# Patient Record
Sex: FEMALE | Race: BLACK OR AFRICAN AMERICAN | Employment: OTHER | URBAN - METROPOLITAN AREA
[De-identification: names, ages, dates, MRNs, and addresses within clinical notes are randomized per-mention and may not be internally consistent; named-entity substitution may affect disease eponyms.]

---

## 2020-10-02 ENCOUNTER — HOSPITAL ENCOUNTER (OUTPATIENT)
Dept: WOUND CARE | Age: 85
Discharge: HOME | End: 2020-10-02
Payer: MEDICARE

## 2020-10-02 VITALS
HEART RATE: 61 BPM | SYSTOLIC BLOOD PRESSURE: 152 MMHG | HEIGHT: 55 IN | WEIGHT: 212 LBS | TEMPERATURE: 98 F | DIASTOLIC BLOOD PRESSURE: 67 MMHG | BODY MASS INDEX: 49.06 KG/M2 | RESPIRATION RATE: 18 BRPM

## 2020-10-02 PROBLEM — Z95.0 PACEMAKER: Status: ACTIVE | Noted: 2020-10-02

## 2020-10-02 PROBLEM — R60.0 LOWER EXTREMITY EDEMA: Status: ACTIVE | Noted: 2020-10-02

## 2020-10-02 PROBLEM — I50.30 HEART FAILURE, DIASTOLIC (HCC): Status: ACTIVE | Noted: 2020-10-02

## 2020-10-02 PROBLEM — L97.929 IDIOPATHIC CHRONIC VENOUS HYPERTENSION OF LEFT LOWER EXTREMITY WITH ULCER (HCC): Status: ACTIVE | Noted: 2020-10-02

## 2020-10-02 PROBLEM — I87.312 IDIOPATHIC CHRONIC VENOUS HYPERTENSION OF LEFT LOWER EXTREMITY WITH ULCER (HCC): Status: ACTIVE | Noted: 2020-10-02

## 2020-10-02 PROCEDURE — 99214 OFFICE O/P EST MOD 30 MIN: CPT

## 2020-10-02 PROCEDURE — 29580 STRAPPING UNNA BOOT: CPT

## 2020-10-02 NOTE — WOUND CARE
[address] Progress Note and Procedure Note NO Procedure Ekaterina Agarwal MEDICAL RECORD NUMBER:  [id number] AGE: 80 y.o. RACE BLACK OR   GENDER: female  : 12/10/1917 EPISODE DATE:  10/2/2020 Subjective:  
8-year-old female in remarkably good health presents with bilateral lower extremity edema and a painful ulcer on the posterior left calf distally. Chief Complaint Patient presents with  Wound Check  
  left leg HISTORY of PRESENT ILLNESS HPI Ekaterina Agarwal is a 80 y.o. female who presents today for wound/ulcer evaluation. History of Wound Context: LE edema, L calf ulcer Wound/Ulcer Pain Timing/Severity: moderate Quality of pain: burning Severity:  3  10 Modifying Factors: Pain is relieved/improved with rest 
Associated Signs/Symptoms: edema Ulcer Identification: 
Ulcer Type: venous Contributing Factors: edema Wound: L calf PAST MEDICAL HISTORY Past Medical History:  
Diagnosis Date  Arrhythmia   
 pacemaker  Arthritis  Chronic kidney disease  Heart failure ([facility] [address]  Hypertension PAST SURGICAL HISTORY Past Surgical History:  
Procedure Laterality Date  HX PACEMAKER    
 
 
FAMILY HISTORY No family history on file. SOCIAL HISTORY Social History Tobacco Use  Smoking status: Never Smoker Substance Use Topics  Alcohol use: Yes Alcohol/week: 1.0 standard drinks Types: 1 Glasses of wine per week  Drug use: Never ALLERGIES No Known Allergies MEDICATIONS No current outpatient medications on file prior to encounter. No current facility-administered medications on file prior to encounter. REVIEW OF SYSTEMS Pertinent items are noted in HPI. Objective:  
 
Visit Vitals BP (!) 152/67 (BP Patient Position: At rest;Sitting) Pulse 61 Temp 98 °F (36.7 °C) Resp 18 Ht 3' (0.914 m) Wt 96.2 kg (212 lb) .01 kg/m² Wt Readings from Last 3 Encounters:  
10/02/20 96.2 kg (212 lb) PHYSICAL EXAM 
 
Pertinent items are noted in HPI. Small posterior left calf venous stasis ulcer associated with extensive edema of both lower extremities. The left leg ulcer is small, with healthy granulation base. There is no evidence of infection. Assessment:  
Left lower extremity venous stasis ulcer with bilateral lower extremity edema Problem List Items Addressed This Visit None

## 2020-10-02 NOTE — DISCHARGE INSTRUCTIONS
Discharge Instructions for  [address] [place] [address], [address] [place] [address]  Telephone: [phone]    NAME:  Madeline Gordillo OF BIRTH:  12/10/1917  MEDICAL RECORD NUMBER:  [id number]  DATE:  10/2/2020      Return Appointment:  [] Dressing supply provider:   [x] Home Healthcare: ?  [] Return Appointment: 2 Week(s)  [] Nurse Visit: Sean Perez)  [] Discharge from [facility]    [] Ordered tests:   [] Referrals:     Wound Cleansing:   Do not scrub or use excessive force. Cleanse wound prior to applying a clean dressing with:  [] Normal Saline   [x] Mild Soap & Water    [] Keep Wound Dry in Shower  [] Other:      Topical Treatments:  Do not apply lotions, creams, or ointments to wound bed unless directed. [] Apply moisturizing lotion to skin surrounding the wound prior to dressing change.  [] Apply antifungal ointment to skin surrounding the wound prior to dressing change.  [] Apply thin film of moisture barrier ointment to skin immediately around wound. [] Other:       Dressings:           Wound Location LLL  [x] Apply Primary Dressing:                      [x] Collagen with Silver                   [x] Cover and Secure with:     [x] Gauze [] Thierry [] Kerlix   [] Foam [] Super Absorbant [] ABD     [] ACE Wrap            [] Other:    Avoid contact of tape with skin. [x] Change dressing: [] Daily    [] Every Other Day [] Once per week   [] Three times per week [] Do Not Change Dressing   [x] Other: TWICE WEEKLY                  Edema Control:       [x] Elevate leg(s) above the level of the heart when sitting. [x] Avoid prolonged standing in one place. Compression:  Apply: [x] Multilayer Compression Wrap: [x]RightLeg [x]Left Leg                                 []Profore  []Profore Lite             [x]Unna     [x] Do not get leg(s) with wrap wet. If wraps become too tight call the center or completely remove the wrap.       [x] Elevate leg(s) above the level of the heart when sitting. [x] Avoid prolonged standing in one place. Dietary:  [x] Diet as tolerated: [] Calorie Diabetic Diet: [] No Added Salt:  [] Increase Protein: [] Other:     Activity:  [x] Activity as tolerated:    [] Patient has no activity restrictions       [] Strict Bedrest:   [] Remain off Work:       [] May return to full duty work:                                     [] Return to work with restrictions:                Electronically signed Loren Jackson RN on 10/2/2020 at 11:55 AM     [address]: Should you experience any significant changes in your wound(s) or have questions about your wound care, please contact Kelly Echeverria 26 at [place] [phone] 8:00 am - 4:30. If you need help with your wound outside of these hours and cannot wait until we are again available, contact your PCP or go to the hospital emergency room. PLEASE NOTE: IF YOU ARE UNABLE TO OBTAIN WOUND SUPPLIES, CONTINUE TO USE THE SUPPLIES YOU HAVE AVAILABLE UNTIL YOU ARE ABLE TO REACH US. IT IS MOST IMPORTANT TO KEEP THE WOUND COVERED AT ALL TIMES.     [x] Dr. Bella Hernandes   [] Dr. Zohra Koehler  [] Estella Lares [facility]  [] Dr. Cisco Buerger

## 2020-10-02 NOTE — WOUND CARE
[employer] Below Knee NAME:  Chava Israel YOB: 1917 MEDICAL RECORD NUMBER:  [id number] DATE:  10/2/2020 Appied primary and secondary dressing as ordered. Applied Unna roll from toes to knee overlapping each time. Applied ace wrap or coban from toes to below the knee. Secured with tape and/or metal clips covered with tape. Instructed patient/caregiver to keep dressing dry and intact. DO NOT REMOVE DRESSING. Instructed pt/family/caregiver to report excessive draining, loose bandage, wet dressing, severe pain or tingling in toes. Applied [employer] dressing below the knee to bilateral lower legs. Unna Boot(s) were applied per  Guidelines.  
 
 Electronically signed by Grecia Malik RN on 10/2/2020 at 12:07 PM

## 2020-10-12 NOTE — WOUND CARE
Spoke with Home health nurse she states that patient is unable to tolerate unna boot compression. Spoke with Dr. Kendall Tena and he agreed that it was ok to use patients zipper compression stocking which she tolerates better.

## 2020-10-16 ENCOUNTER — HOSPITAL ENCOUNTER (OUTPATIENT)
Dept: WOUND CARE | Age: 85
Discharge: HOME | End: 2020-10-16
Payer: MEDICARE

## 2020-10-16 VITALS
HEART RATE: 82 BPM | DIASTOLIC BLOOD PRESSURE: 88 MMHG | TEMPERATURE: 97.8 F | RESPIRATION RATE: 20 BRPM | SYSTOLIC BLOOD PRESSURE: 134 MMHG

## 2020-10-16 DIAGNOSIS — L97.929 IDIOPATHIC CHRONIC VENOUS HYPERTENSION OF LEFT LOWER EXTREMITY WITH ULCER (HCC): ICD-10-CM

## 2020-10-16 DIAGNOSIS — I87.312 IDIOPATHIC CHRONIC VENOUS HYPERTENSION OF LEFT LOWER EXTREMITY WITH ULCER (HCC): ICD-10-CM

## 2020-10-16 DIAGNOSIS — R60.0 LOWER EXTREMITY EDEMA: ICD-10-CM

## 2020-10-16 PROCEDURE — 99213 OFFICE O/P EST LOW 20 MIN: CPT

## 2020-10-16 RX ORDER — LIDOCAINE HYDROCHLORIDE 20 MG/ML
15 SOLUTION OROPHARYNGEAL AS NEEDED
Status: DISCONTINUED | OUTPATIENT
Start: 2020-10-16 | End: 2020-10-18 | Stop reason: HOSPADM

## 2020-10-16 NOTE — WOUND CARE
[address] Progress Note and Procedure Note NO Procedure Kayla Zelaya MEDICAL RECORD NUMBER:  [id number] AGE: 80 y.o. RACE BLACK OR   GENDER: female  : 12/10/1917 EPISODE DATE:  10/16/2020 Subjective: No new problems Removed Unna boot due to pain, \"too tight\" Does not have compression hose Chief Complaint Patient presents with  Wound Check  
  left leg HISTORY of PRESENT ILLNESS HPI Kayla Zelaya is a 80 y.o. female who presents today for wound/ulcer evaluation. History of Wound Context: L leg Wound/Ulcer Pain Timing/Severity: mild Quality of pain: dull Severity:  1 / 10 Modifying Factors: Pain is relieved/improved with rest 
Associated Signs/Symptoms: edema Ulcer Identification: 
Ulcer Type: venous Contributing Factors: edema Wound: L leg PAST MEDICAL HISTORY Past Medical History:  
Diagnosis Date  Arrhythmia   
 pacemaker  Arthritis  Chronic kidney disease  Heart failure ([facility] [address]  Hypertension PAST SURGICAL HISTORY Past Surgical History:  
Procedure Laterality Date  HX PACEMAKER    
 
 
FAMILY HISTORY No family history on file. SOCIAL HISTORY Social History Tobacco Use  Smoking status: Never Smoker Substance Use Topics  Alcohol use: Yes Alcohol/week: 1.0 standard drinks Types: 1 Glasses of wine per week  Drug use: Never ALLERGIES No Known Allergies MEDICATIONS No current outpatient medications on file prior to encounter. No current facility-administered medications on file prior to encounter. REVIEW OF SYSTEMS Pertinent items are noted in HPI. Objective:  
 
Visit Vitals /88 (BP Patient Position: At rest;Sitting) Pulse 82 Temp 97.8 °F (36.6 °C) Resp 20 Wt Readings from Last 3 Encounters:  
10/02/20 96.2 kg (212 lb) PHYSICAL EXAM 
Wound measures smaller, minimal slough, no infetcion Pertinent items are noted in HPI. Assessment:  
 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 Wound Leg lower Left;Lateral #1 left lateral lower leg 10/02/20 (Active) Wound Length (cm) 0.5 cm 10/16/20 1307 Wound Width (cm) 0.8 cm 10/16/20 1307 Wound Depth (cm) 0.2 cm 10/16/20 1307 Wound Surface Area (cm^2) 0.4 cm^2 10/16/20 1307 Wound Volume (cm^3) 0.08 cm^3 10/16/20 1307 Change in Wound Size % 74.36 10/16/20 1307 Condition of Base Granulation;Slough; Undermined 10/16/20 1307 Condition of Edges Open 10/16/20 1307 Drainage Amount Small 10/16/20 1307 Drainage Color Serosanguinous 10/16/20 1307 Wound Odor None 10/16/20 1307 Juliet-wound Assessment Hyperpigmented 10/16/20 1307 Cleansing and Cleansing Agents  Normal saline 10/16/20 1307 Dressing Changed Changed/New 10/02/20 1203 Number of days: 258 Plan:  
Continue tati + double tubi Treatment Note please see attached Discharge Instructions Written patient dismissal instructions given to patient or POA.       
 
Electronically signed by Yahir Adame MD on 10/16/2020 at 1:35 PM

## 2020-11-13 NOTE — DISCHARGE INSTRUCTIONS
Discharge Instructions for  [facility], [facility]  Telephone: [phone]    NAME:  Madeline Gordillo OF BIRTH:  12/10/1917  MEDICAL RECORD NUMBER:  [id number]  DATE:  10/16/2020      Return Appointment:  [] Dressing supply provider:   [x] Home Healthcare: Corey  [x] Return Appointment: 2 Week(s)  [] Nurse Visit: Sean Perez)  [] Discharge from [facility]    [] Ordered tests:   [] Referrals:     Wound Cleansing:   Do not scrub or use excessive force. Cleanse wound prior to applying a clean dressing with:  [x] Normal Saline   [] Mild Soap & Water    [] Keep Wound Dry in Shower  [] Other:         Dressings:           Wound Location Left lower leg  [x] Apply Primary Dressing:                       [x] Collagen with Silver                   [x] Cover and Secure with:     [x] Gauze [x] Thierry [] Kerlix   [] Foam [] Super Absorbant [] ABD     [] ACE Wrap            [] Other:    Avoid contact of tape with skin. [x] Change dressing: [] Daily    [] Every Other Day [] Once per week   [] Three times per week [] Do Not Change Dressing   [x] Other: twice weekly       Edema Control:  Apply: [x]Left Leg Double Layer           [x] Elevate leg(s) above the level of the heart when sitting. [x] Avoid prolonged standing in one place. [x] Do not get leg(s) with wrap wet. If wraps become too tight call the center or completely remove the wrap. [x] Elevate leg(s) above the level of the heart when sitting. [x] Avoid prolonged standing in one place.         Dietary:  [x] Diet as tolerated: [] Calorie Diabetic Diet: [] No Added Salt:  [] Increase Protein: [] Other:     Activity:  [x] Activity as tolerated:    [] Patient has no activity restrictions       [] Strict Bedrest:   [] Remain off Work:       [] May return to full duty work:                                     [] Return to work with restrictions:                Electronically signed Guero Perez RN on 10/16/2020 at [address] [place] [address]: Should you experience any significant changes in your wound(s) or have questions about your wound care, please contact Kelly Echeverria [address] at [place] [address] 8:00 am - 4:30. If you need help with your wound outside of these hours and cannot wait until we are again available, contact your PCP or go to the hospital emergency room. PLEASE NOTE: IF YOU ARE UNABLE TO OBTAIN WOUND SUPPLIES, CONTINUE TO USE THE SUPPLIES YOU HAVE AVAILABLE UNTIL YOU ARE ABLE TO REACH US. IT IS MOST IMPORTANT TO KEEP THE WOUND COVERED AT ALL TIMES.     [x] Dr. Sherie Neal   [] Dr. Nya Topete  [] Lamberto [facility]  [] Dr. Saroj Kurtz

## 2020-11-17 ENCOUNTER — HOSPITAL ENCOUNTER (OUTPATIENT)
Dept: WOUND CARE | Age: 85
Discharge: HOME | End: 2020-11-17
Payer: MEDICARE

## 2020-11-17 DIAGNOSIS — R60.0 LOWER EXTREMITY EDEMA: ICD-10-CM

## 2020-11-17 DIAGNOSIS — I87.312 IDIOPATHIC CHRONIC VENOUS HYPERTENSION OF LEFT LOWER EXTREMITY WITH ULCER (HCC): ICD-10-CM

## 2020-11-17 DIAGNOSIS — L97.929 IDIOPATHIC CHRONIC VENOUS HYPERTENSION OF LEFT LOWER EXTREMITY WITH ULCER (HCC): ICD-10-CM

## 2020-11-17 PROCEDURE — 74011000250 HC RX REV CODE- 250: Performed by: SPECIALIST

## 2020-11-17 PROCEDURE — 99213 OFFICE O/P EST LOW 20 MIN: CPT

## 2020-11-17 RX ORDER — LIDOCAINE HYDROCHLORIDE 20 MG/ML
15 SOLUTION OROPHARYNGEAL AS NEEDED
Status: DISCONTINUED | OUTPATIENT
Start: 2020-11-17 | End: 2020-11-19 | Stop reason: HOSPADM

## 2020-11-17 RX ORDER — LIDOCAINE HYDROCHLORIDE 20 MG/ML
JELLY TOPICAL AS NEEDED
COMMUNITY
End: 2022-03-29

## 2020-11-17 NOTE — WOUND CARE
[address]   Progress Note and Procedure Note   NO Procedure      Clint Grey  MEDICAL RECORD NUMBER:  [id number]  AGE: 80 y.o. RACE BLACK OR   GENDER: female  : 12/10/1917  EPISODE DATE:  2020    Subjective:   Patient was last seen here on . She presents now with a recurrence of the left posterior leg ulcer. Chief Complaint   Patient presents with    Wound Check     left leg wound         HISTORY of PRESENT ILLNESS HPI    Clint Grey is a 80 y.o. female who presents today for wound/ulcer evaluation. History of Wound Context: LLE VSU  Wound/Ulcer Pain Timing/Severity: mild and moderate  Quality of pain: aching and sharp  Severity:  2 / 10   Modifying Factors: Pain is relieved/improved with rest  Associated Signs/Symptoms: edema    Ulcer Identification:  Ulcer Type: venous    Contributing Factors: venous stasis    Wound: LLE VSU         PAST MEDICAL HISTORY    Past Medical History:   Diagnosis Date    Arrhythmia     pacemaker    Arthritis     Chronic kidney disease     Heart failure ([address]     Hypertension         PAST SURGICAL HISTORY    Past Surgical History:   Procedure Laterality Date    HX PACEMAKER         FAMILY HISTORY    Family History   Family history unknown: Yes       SOCIAL HISTORY    Social History     Tobacco Use    Smoking status: Never Smoker    Smokeless tobacco: Never Used   Substance Use Topics    Alcohol use: Yes     Alcohol/week: 1.0 standard drinks     Types: 1 Glasses of wine per week    Drug use: Never       ALLERGIES    No Known Allergies    MEDICATIONS    Current Outpatient Medications on File Prior to Encounter   Medication Sig Dispense Refill    lidocaine (XYLOCAINE) 2 % jelly Apply  to affected area as needed. Apply topically to wound prior to procedure. Indications: local anesthesia for tube insertion into trachea       No current facility-administered medications on file prior to encounter. REVIEW OF SYSTEMS    Pertinent items are noted in HPI. Objective: There were no vitals taken for this visit. Wt Readings from Last 3 Encounters:   10/02/20 96.2 kg (212 lb)       PHYSICAL EXAM  Small open wound L calf, with surrounding  Skin changes c/w VSU, no infection  Pertinent items are noted in HPI. 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 Wound Leg lower Left;Posterior #1 10/02/20 (Active)   Wound Etiology Venous 11/17/20 0905   Dressing Status Clean;Dry; Intact 11/17/20 0905   Cleansed Cleansed with saline 11/17/20 0905   Wound Length (cm) 1.1 cm 11/17/20 0905   Wound Width (cm) 1 cm 11/17/20 0905   Wound Depth (cm) 0.2 cm 11/17/20 0905   Wound Surface Area (cm^2) 1.1 cm^2 11/17/20 0905   Change in Wound Size % 29.49 11/17/20 0905   Wound Volume (cm^3) 0.22 cm^3 11/17/20 0905   Wound Healing % -38 11/17/20 0905   Wound Assessment Granulation tissue;Slough 11/17/20 0905   Drainage Amount Small 11/17/20 0905   Drainage Description Serosanguinous 11/17/20 0905   Wound Odor None 11/17/20 0905   Juliet-Wound/Incision Assessment Hyperpigmented 11/17/20 0905   Edges Attached edges 11/17/20 0905   Wound Thickness Description Full thickness 11/17/20 0905   Read-Only, Retired. Condition of Base Granulation;Slough; Undermined 10/16/20 1307   Read-Only, Retired. Condition of Edges Open 10/16/20 1307   Read-Only, Retired.  Cleansing and Cleansing Agents Normal saline 10/16/20 1307   Number of days: 290        Plan:   Marlena, double tubi (did not tolerate Unna, pain)  If not healing, consider venous study, may benefit from ablation/sclero    Treatment Note please see attached Discharge Instructions    Written patient dismissal instructions given to patient or POA.          Electronically signed by Iman Monroe MD on 11/17/2020 at 9:22 AM

## 2020-11-17 NOTE — DISCHARGE INSTRUCTIONS
Discharge Instructions for  [address] [place] [address], [address] [place] [address]  Telephone: [phone]    NAME:  Bri Marie OF BIRTH:  12/10/1917  MEDICAL RECORD NUMBER:  [id number]  DATE:  11/17/2020      Return Appointment:  [] Dressing supply provider:   [x] Home Healthcare: Stacie Santiago  [x] Return Appointment: 2 Week(s)  [] Nurse Visit: Stone Jorge)  [] Discharge from [facility]    [] Ordered tests:   [] Referrals:     Wound Cleansing:   Do not scrub or use excessive force. Cleanse wound prior to applying a clean dressing with:  [] Normal Saline   [x] Mild Soap & Water    [] Keep Wound Dry in Shower  [] Other:      Topical Treatments:  Do not apply lotions, creams, or ointments to wound bed unless directed. [x] Apply moisturizing lotion to skin surrounding the wound prior to dressing change.  [] Apply antifungal ointment to skin surrounding the wound prior to dressing change.  [] Apply thin film of moisture barrier ointment to skin immediately around wound. [] Other:       Dressings:           Wound Location Left leg  [x] Apply Primary Dressing:       [] MediHoney Gel    [] MediHoney Alginate               [] Calcium Alginate      [] Calcium Alginate with Silver   [] Collagen                   [x] Collagen with Silver                [] Santyl with Moistened saline gauze              [] Hydrofera Blue (cut to size and moistened)  [] Hydrofera Blue Ready (Cut to size)   [] NS wet to dry    [] Betadine wet to dry              [] Hydrogel                [] Mepitel     [] Bactroban/Mupirocin               [] Other:      [x] Cover and Secure with:     [x] Gauze [x] Thierry [] Kerlix   [] Foam [] Super Absorbant [] ABD     [] ACE Wrap            [] Other:    Avoid contact of tape with skin.     [x] Change dressing: [] Daily    [] Every Other Day [] Once per week   [x] Three times per week [] Do Not Change Dressing   [] Other:       Edema Control:  Apply: [] Compression Stocking []Right Leg []Left Leg   [x] Tubigrip [x]Right Leg Double Layer [x]Left Leg Double Layer      []Right Leg Single Layer []Left Leg Single Layer     [x] Elevate leg(s) above the level of the heart when sitting. [x] Avoid prolonged standing in one place. Dietary:  [x] Diet as tolerated: [] Calorie Diabetic Diet: [] No Added Salt:  [] Increase Protein: [] Other:     Activity:  [x] Activity as tolerated:    [] Patient has no activity restrictions       [] Strict Bedrest:   [] Remain off Work:       [] May return to full duty work:                                     [] Return to work with restrictions:                Electronically signed Bartolome Rueda RN on 11/17/2020 at 9:10 AM     Anast[address]: Should you experience any significant changes in your wound(s) or have questions about your wound care, please contact Kelly Echeverria 26 at [place] [address] 8:00 am - 4:30. If you need help with your wound outside of these hours and cannot wait until we are again available, contact your PCP or go to the hospital emergency room. PLEASE NOTE: IF YOU ARE UNABLE TO OBTAIN WOUND SUPPLIES, CONTINUE TO USE THE SUPPLIES YOU HAVE AVAILABLE UNTIL YOU ARE ABLE TO REACH US. IT IS MOST IMPORTANT TO KEEP THE WOUND COVERED AT ALL TIMES.     [x] Dr. Atul Abdullahi   [] Dr. Quincy Gotti  [] Shanika Zamora [facility]  [] Dr. Abby Munson

## 2020-12-01 ENCOUNTER — HOSPITAL ENCOUNTER (OUTPATIENT)
Dept: WOUND CARE | Age: 85
Discharge: HOME | End: 2020-12-01
Payer: MEDICARE

## 2020-12-01 VITALS
SYSTOLIC BLOOD PRESSURE: 158 MMHG | HEIGHT: 63 IN | DIASTOLIC BLOOD PRESSURE: 86 MMHG | TEMPERATURE: 98.1 F | HEART RATE: 64 BPM | RESPIRATION RATE: 20 BRPM | WEIGHT: 212 LBS | BODY MASS INDEX: 37.56 KG/M2

## 2020-12-01 DIAGNOSIS — L97.929 IDIOPATHIC CHRONIC VENOUS HYPERTENSION OF LEFT LOWER EXTREMITY WITH ULCER (HCC): ICD-10-CM

## 2020-12-01 DIAGNOSIS — R60.0 LOWER EXTREMITY EDEMA: ICD-10-CM

## 2020-12-01 DIAGNOSIS — I87.312 IDIOPATHIC CHRONIC VENOUS HYPERTENSION OF LEFT LOWER EXTREMITY WITH ULCER (HCC): ICD-10-CM

## 2020-12-01 PROCEDURE — 11042 DBRDMT SUBQ TIS 1ST 20SQCM/<: CPT

## 2020-12-01 RX ORDER — LIDOCAINE HYDROCHLORIDE 20 MG/ML
15 SOLUTION OROPHARYNGEAL AS NEEDED
Status: DISCONTINUED | OUTPATIENT
Start: 2020-12-01 | End: 2020-12-03 | Stop reason: HOSPADM

## 2020-12-01 NOTE — WOUND CARE
[address] Progress Note and Procedure Note Teofilo Moise MEDICAL RECORD NUMBER:  [id number] AGE: 80 y.o. RACE BLACK OR   GENDER: female  : 12/10/1917 EPISODE DATE:  2020 Subjective: Chief Complaint Patient presents with  Wound Check LLL post  
  
  
HISTORY of PRESENT ILLNESS HPI Teofilo Moise is a 80 y.o. female who presents today for wound/ulcer evaluation. History of Wound Context: LLE VSU Wound/Ulcer Pain Timing/Severity: mild Quality of pain: dull Severity:  0 / 10 Modifying Factors: None Associated Signs/Symptoms: none Ulcer Identification: 
Ulcer Type: venous Contributing Factors: edema Wound: LLE VSU  
     
PAST MEDICAL HISTORY Past Medical History:  
Diagnosis Date  Arrhythmia   
 pacemaker  Arthritis  Chronic kidney disease  Heart failure ([address]  Hypertension PAST SURGICAL HISTORY Past Surgical History:  
Procedure Laterality Date  HX PACEMAKER    
 
 
FAMILY HISTORY Family History Family history unknown: Yes SOCIAL HISTORY Social History Tobacco Use  Smoking status: Never Smoker  Smokeless tobacco: Never Used Substance Use Topics  Alcohol use: Yes Alcohol/week: 1.0 standard drinks Types: 1 Glasses of wine per week  Drug use: Never ALLERGIES No Known Allergies MEDICATIONS Current Outpatient Medications on File Prior to Encounter Medication Sig Dispense Refill  lidocaine (XYLOCAINE) 2 % jelly Apply  to affected area as needed. Apply topically to wound prior to procedure. Indications: local anesthesia for tube insertion into trachea No current facility-administered medications on file prior to encounter. REVIEW OF SYSTEMS Pertinent items are noted in HPI. Objective:  
 
Visit Vitals BP (!) 158/86 (BP 1 Location: Right arm, BP Patient Position: At rest;Sitting) Pulse 64 Temp 98.1 °F (36.7 °C) Resp 20 Ht 5' 3\" (1.6 m) Wt 96.2 kg (212 lb) BMI 37.55 kg/m² Wt Readings from Last 3 Encounters:  
12/01/20 96.2 kg (212 lb) 10/02/20 96.2 kg (212 lb) PHYSICAL EXAM 
No change in LLE well demarcated ulcer c/w venous ulcer Granulating, no infection Pertinent items are noted in HPI. 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  
Continue tati, tubi [facility] for reflux study, possible ablation or sclero Treatment Note please see attached Discharge Instructions Written patient dismissal instructions given to patient or POA. Electronically signed by Zhen Horvath MD on 12/1/2020 at 1:35 PM 
 
 
 
 
 
 
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 Performed by: Zhen Horvath MD 
 
Consent obtained: Yes Time out taken: Yes Debridement: Excisional 
 
Using curette the wound(s)/ulcer(s) was/were sharply debrided down through and including the removal of   
subcutaneous tissue Devitalized Tissue Debrided: slough Pre Debridement Measurements: 
Are located in the [place]  Documentation Flow Sheet Wound/Ulcer #: 1 Post Debridement Measurements: 
Wound/Ulcer Descriptions are Pre Debridement except measurements:Non-Pressure ulcer, fat layer exposed Wound Leg lower Left;Posterior #1 10/02/20 (Active) Wound Etiology Venous 12/01/20 1311 Dressing Status Clean;Dry; Intact 12/01/20 1311 Cleansed Soap and water 12/01/20 1311 Wound Length (cm) 1 cm 12/01/20 1311 Wound Width (cm) 1.4 cm 12/01/20 1311 Wound Depth (cm) 0.2 cm 12/01/20 1311 Wound Surface Area (cm^2) 1.4 cm^2 12/01/20 1311 Change in Wound Size % 10.26 12/01/20 1311 Wound Volume (cm^3) 0.28 cm^3 12/01/20 1311 Wound Healing % -75 12/01/20 1311 Post-Procedure Length (cm) 1 cm 12/01/20 1329 Post-Procedure Width (cm) 1.4 cm 12/01/20 1329 Post-Procedure Depth (cm) 0.2 cm 12/01/20 1329 Post-Procedure Surface Area (cm^2) 1.4 cm^2 12/01/20 1329 Post-Procedure Volume (cm^3) 0.28 cm^3 12/01/20 1329 Wound Assessment Erythema;Granulation tissue;Slough; Subcutaneous 12/01/20 1311 Drainage Amount Small 12/01/20 1311 Drainage Description Serosanguinous 12/01/20 1311 Wound Odor None 12/01/20 1311 Juliet-Wound/Incision Assessment Intact 12/01/20 1311 Edges Defined edges 12/01/20 1311 Wound Thickness Description Full thickness 12/01/20 1311 Read-Only, Retired. Condition of Base Granulation;Slough; Undermined 10/16/20 1307 Read-Only, Retired. Condition of Edges Open 10/16/20 1307 Read-Only, Retired. Cleansing and Cleansing Agents Normal saline 10/16/20 1307 Number of days: 304 Percent of Wound(s)/Ulcer(s) Debrided: 30% Total Surface Area Debrided:  0.5 sq cm Diabetic/Pressure/Non Pressure Ulcers only: 
Ulcer:  
 
Estimated Blood Loss:  None Hemostasis Achieved: Pressure Procedural Pain: 0 / 10 Post Procedural Pain: 0 / 10 Response to treatment: Well tolerated by patient

## 2020-12-04 NOTE — DISCHARGE INSTRUCTIONS
Discharge Instructions for  [address] [place] [address], [address] [place] [address]  Telephone: [phone]    NAME:  Winton Moritz OF BIRTH:  12/10/1917  MEDICAL RECORD NUMBER:  [id number]  DATE:  12/1/2020      Return Appointment:  [] Dressing supply provider:   [x] Home Healthcare: Colette Wade  [x] Return Appointment: 2 Week(s)  [] Nurse Visit: Everett Schofield)    [] Discharge from [facility]  [] Ordered tests:   [x] Referrals: [facility] for venous insufficiency   [] Rx:     Wound Cleansing:   Do not scrub or use excessive force. Cleanse wound prior to applying a clean dressing with:  [] Normal Saline   [x] Mild Soap & Water    [] Keep Wound Dry in Shower  [] Other:      Topical Treatments:  Do not apply lotions, creams, or ointments to wound bed unless directed. [x] Apply moisturizing lotion to skin surrounding the wound prior to dressing change.  [] Apply antifungal ointment to skin surrounding the wound prior to dressing change.  [] Apply thin film of moisture barrier ointment to skin immediately around wound. [] Other:       Dressings:           Wound Location Left leg   [x] Apply Primary Dressing:       [] MediHoney Gel    [] MediHoney Alginate               [] Calcium Alginate      [] Calcium Alginate with Silver   [] Collagen                   [x] Collagen with Silver                [] Santyl with Moistened saline gauze              [] Hydrofera Blue (cut to size and moistened)  [] Hydrofera Blue Ready (Cut to size)   [] NS wet to dry    [] Betadine wet to dry              [] Hydrogel                [] Mepitel     [] Bactroban/Mupirocin               [] Other:     [x] Cover and Secure with:     [x] Gauze [x] Thierry [] Kerlix   [] Foam [] Super Absorbant [] ABD     [] ACE Wrap            [] Other:    Avoid contact of tape with skin.     [x] Change dressing: [] Daily    [] Every Other Day [] Once per week   [] Twice per week   [x] Three times per week [] Do Not Change Dressing   [] Other:     Edema Control:  Apply: [] Compression Stocking []Right Leg []Left Leg   [x] Tubigrip [x]Right Leg Double Layer [x]Left Leg Double Layer      []Right Leg Single Layer []Left Leg Single Layer     [x] Elevate leg(s) above the level of the heart when sitting. [x] Avoid prolonged standing in one place. Dietary:  [x] Diet as tolerated: [] Calorie Diabetic Diet: [x] No Added Salt:  [] Increase Protein: [] Other:     Activity:  [x] Activity as tolerated:    [] Patient has no activity restrictions       [] Strict Bedrest:   [] Remain off Work:       [] May return to full duty work:                                     [] Return to work with restrictions:                Electronically signed Caity Sánchez RN on 12/1/2020 at [address] [place] [address]: Should you experience any significant changes in your wound(s) or have questions about your wound care, please contact Kelly Pearson at [place] [address] 8:00 am - 4:30. If you need help with your wound outside of these hours and cannot wait until we are again available, contact your PCP or go to the hospital emergency room. PLEASE NOTE: IF YOU ARE UNABLE TO OBTAIN WOUND SUPPLIES, CONTINUE TO USE THE SUPPLIES YOU HAVE AVAILABLE UNTIL YOU ARE ABLE TO REACH US. IT IS MOST IMPORTANT TO KEEP THE WOUND COVERED AT ALL TIMES.     [x] Dr. Elissa Du   [] Dr. Renetta Vee  [] [facility]  [] Dr. Kayce Lewis

## 2022-03-18 PROBLEM — Z95.0 PACEMAKER: Status: ACTIVE | Noted: 2020-10-02

## 2022-03-19 PROBLEM — I87.312 IDIOPATHIC CHRONIC VENOUS HYPERTENSION OF LEFT LOWER EXTREMITY WITH ULCER (HCC): Status: ACTIVE | Noted: 2020-10-02

## 2022-03-19 PROBLEM — L97.929 IDIOPATHIC CHRONIC VENOUS HYPERTENSION OF LEFT LOWER EXTREMITY WITH ULCER (HCC): Status: ACTIVE | Noted: 2020-10-02

## 2022-03-20 PROBLEM — R60.0 LOWER EXTREMITY EDEMA: Status: ACTIVE | Noted: 2020-10-02

## 2022-03-20 PROBLEM — I50.30 HEART FAILURE, DIASTOLIC (HCC): Status: ACTIVE | Noted: 2020-10-02

## 2022-03-29 ENCOUNTER — HOSPITAL ENCOUNTER (OUTPATIENT)
Dept: WOUND CARE | Age: 87
Discharge: HOME | End: 2022-03-29
Payer: MEDICARE

## 2022-03-29 VITALS
TEMPERATURE: 96.9 F | DIASTOLIC BLOOD PRESSURE: 84 MMHG | RESPIRATION RATE: 22 BRPM | HEART RATE: 68 BPM | WEIGHT: 212 LBS | BODY MASS INDEX: 37.56 KG/M2 | HEIGHT: 63 IN | SYSTOLIC BLOOD PRESSURE: 173 MMHG

## 2022-03-29 PROBLEM — L97.822 NON-PRESSURE CHRONIC ULCER OF OTHER PART OF LEFT LOWER LEG WITH FAT LAYER EXPOSED (HCC): Status: ACTIVE | Noted: 2022-03-29

## 2022-03-29 PROCEDURE — 74011000250 HC RX REV CODE- 250: Performed by: SPECIALIST

## 2022-03-29 PROCEDURE — 11042 DBRDMT SUBQ TIS 1ST 20SQCM/<: CPT

## 2022-03-29 PROCEDURE — 99214 OFFICE O/P EST MOD 30 MIN: CPT

## 2022-03-29 PROCEDURE — 11045 DBRDMT SUBQ TISS EACH ADDL: CPT

## 2022-03-29 RX ORDER — ISOSORBIDE MONONITRATE 60 MG/1
60 TABLET, EXTENDED RELEASE ORAL
COMMUNITY

## 2022-03-29 RX ORDER — LIDOCAINE HYDROCHLORIDE 20 MG/ML
15 SOLUTION OROPHARYNGEAL AS NEEDED
Status: DISCONTINUED | OUTPATIENT
Start: 2022-03-29 | End: 2022-03-31 | Stop reason: HOSPADM

## 2022-03-29 RX ORDER — BISMUTH SUBSALICYLATE 262 MG
1 TABLET,CHEWABLE ORAL DAILY
COMMUNITY

## 2022-03-29 RX ORDER — BUMETANIDE 2 MG/1
2 TABLET ORAL DAILY
COMMUNITY

## 2022-03-29 RX ORDER — METOPROLOL SUCCINATE 50 MG/1
50 TABLET, EXTENDED RELEASE ORAL DAILY
COMMUNITY

## 2022-03-29 RX ORDER — MUPIROCIN 20 MG/G
1 OINTMENT TOPICAL DAILY
COMMUNITY

## 2022-03-29 NOTE — DISCHARGE INSTRUCTIONS
Discharge Instructions for  [address] [place] [address], [facility]  Telephone: [phone]     NAME:  Camila Terrell OF BIRTH:  12/10/1917  MEDICAL RECORD NUMBER:  [id number]  DATE:  3/29/2022        Return Appointment:  [x]? Dressing supply provider: ARIANA  []? Home Healthcare:  [x]? Return Appointment: 2 Week(s)  []? Nurse Visit:   Week(s)     []? Discharge from [facility]  [x]? Ordered tests: PVR AND VENOUS REFLUX TO BE DONE AT [facility]- CALL [phone] TO SCHEDULE  []? Referrals:   []? Rx:   []? Other:       Wound Cleansing:   Do not scrub or use excessive force. Cleanse wound prior to applying a clean dressing with:  [x]? Normal Saline          [x]? Mild Soap & Water/Baby Shampoo   []? Keep Wound Dry in Shower  []? Other:       Topical Treatments:  Do not apply lotions, creams, or ointments to wound bed unless directed. []? Apply moisturizing lotion to skin surrounding the wound prior to dressing change. []? Apply antifungal ointment to skin surrounding the wound prior to dressing change. []? Apply thin film of moisture barrier/zinc ointment to skin immediately around wound. []? Other:                  Dressings:                  Wound Location LEFT LEG              [x]? Apply Primary Dressing:                                          []? MediHoney Gel         []? MediHoney Alginate                     []? Calcium Alginate      [x]? Calcium Alginate with Silver              []? Collagen                   []? Collagen with Silver                []? Santyl with Moistened saline gauze              []? Hydrofera Blue (cut to size and moistened)  []? Hydrofera Blue Ready (Cut to size)              []? NS wet to dry            []? Betadine wet to dry              []? Hydrogel                   []? Mepitel                   []? Bactroban/Mupirocin                       []? Other:      [x]? Cover and Secure with:                   [x]?  Gauze [x]? Kevin Shannon           []? Kerlix              []? Foam          []? Super Absorbant    []? ABD                                      []? ACE Wrap            []? Other:               Limit contact of tape with skin.     [x]? Change dressing:  []? Daily           [x]? Every Other Day    []? Once per week   []? Twice per week              []? Three times per week        []? Do Not Change Dressing    []? Other:                Negative Pressure:           Wound Location:   []? Pressure @  mm/Hg                      []?Continuous  []? Intermittent              []? Black Foam            []? White Foam              []? Bridge:               []? Change vac dressing three times per week     Pressure Relief:  []? When sitting, shift position or do seat lifts every 15 minutes. []? Wheelchair cushion           []? Specialty Bed/Mattress  []? Turn every 2 hours when in bed. Avoid direct pressure on wound site. Limit side lying to 30 degree tilt. Limit HOB elevation to 30 degrees. Edema Control:  Apply:  []? Compression Stocking:    mmHg   []? Right Leg     []? Left Leg              [x]? Tubigrip      [x]? Right Leg Double Layer      [x]? Left Leg Double Layer                                      []?Right Leg Single Layer       []? Left Leg Single Layer                 []? Elevate leg(s) above the level of the heart when sitting. []? Avoid prolonged standing in one place.         Compression:  Apply:  []? Multilayer Compression Wrap:      []? RightLeg      []? Left Leg                                 []?Coflex              []?Coflex Lite             []?Unna                 []? Do not get leg(s) with wrap wet. If wraps become too tight call the center or completely remove the wrap. []? Elevate leg(s) above the level of the heart when sitting. []? Avoid prolonged standing in one place.     Off-Loading:   []? Off-loading when   []? walking       []? in bed         []? sitting  []? Total non-weight bearing  []? Right Leg  []? Left Leg         []? Assistive Device    []? Druscilla Covert        []? Cane      []? Wheelchair      []? Crutches              []? Surgical shoe    []? Podus Boot(s)   []? Foam Boot(s)  []? Roll About              []? Cast Boot   []? CROW Boot  []? Wedge Shoe  []? Other:     Contact Cast:  Apply:  []? Total Contact Cast Applied in Clinic          []? RightLeg      []? Left Leg              []? Do not get cast wet. Contact center or go to emergency room if there is a foul odor or becomes uncomfortable due to feeling tight or swelling. Do not use objects inside of cast to scratch.                  Dietary:  []? Diet as tolerated:   [x]? Calorie Diabetic Diet:         []? No Added Salt:  []? Increase Protein:   []? Other:              Activity:  [x]? Activity as tolerated:    []? Patient has no activity restrictions       []? Strict Bedrest:          []? Remain off Work:       []? May return to full duty work:                                               []? Return to work with restrictions:      [facility] [address]: Should you experience any significant changes in your wound(s) or have questions about your wound care, please contact Kelly Pearson at [place] [address] 8:00 am - 4:30. If you need help with your wound outside of these hours and cannot wait until we are again available, contact your PCP or go to the hospital emergency room.      PLEASE NOTE: IF YOU ARE UNABLE TO [place] [address], CONTINUE TO USE THE SUPPLIES YOU HAVE AVAILABLE UNTIL YOU ARE ABLE TO [facility]. IT IS MOST IMPORTANT TO KEEP THE WOUND COVERED AT ALL TIMES.     [x]? Dr. Brain Fonseca   []? Dr. Tulio Santoyo  []?  Dr. Gisel Pacheco

## 2022-03-29 NOTE — WOUND CARE
[facility] [address] f: [phone] f: [phone] p: [phone] [email or web address]     Ordering Center:     [address] [place] [address] OP WOUND CARE  [address] [place] [address]  [address] [phone]  WOUND CARE [email or web address]   FAX NUMBER [unfilled]    Patient Information:      Kuldeep Harris  [address] [address] [place] [address] [address] [phone]   [unfilled]   : 12/10/1917  AGE: 80 y.o. GENDER: female   TODAYS DATE:  3/29/2022    Insurance:      PRIMARY INSURANCE:  [id number] - (Medicare)    Payor/Plan Subscr  Sex Relation Sub. Ins. ID Effective Group Num   1. MARKUS (MEDIC* KIMI HERNÁNDEZ 12/10/1917 Female Self [id number]                                     [address] [address] -   2. [place] -* Fernanda Mortensen 12/10/1917 Female Self [id number] 1982                                    [address]       Patient Wound Information:      Problem List Items Addressed This Visit     None          WOUNDS REQUIRING DRESSING SUPPLIES:     Wound Leg lower Posterior; Left #1 22 (Active)   Wound Image   22 1031   Wound Etiology Venous 22 1031   Dressing Status Breakthrough drainage noted 22 1031   Cleansed Cleansed with saline 22 1031   Wound Length (cm) 6.3 cm 22 1031   Wound Width (cm) 6 cm 22 1031   Wound Depth (cm) 0.1 cm 22 1031   Wound Surface Area (cm^2) 37.8 cm^2 22 1031   Wound Volume (cm^3) 3.78 cm^3 22 1031   Post-Procedure Length (cm) 6.3 cm 22 1050   Post-Procedure Width (cm) 6 cm 22 1050   Post-Procedure Depth (cm) 0.1 cm 22 1050   Post-Procedure Surface Area (cm^2) 37.8 cm^2 22 1050   Post-Procedure Volume (cm^3) 3.78 cm^3 22 1050   Wound Assessment Slough;Granulation tissue 22 1031   Drainage Amount Moderate 22 1031   Drainage Description Serosanguinous 22 1031   Wound Odor None 22 1031   Juliet-Wound/Incision Assessment Hyperpigmented;Dry/flaky 03/29/22 1031   Edges Attached edges 03/29/22 1031   Wound Thickness Description Full thickness 03/29/22 1031   Number of days: 0        Supplies Requested :      WOUND #:1   PRIMARY DRESSING:  Other: silvercel   4X4 gauze pad, Conforming roll gauze and Other size G tubi     FREQUENCY OF DRESSING CHANGES:  Every other day                                     ADDITIONAL ITEMS:  [] Gloves Small  [x] Gloves Medium [] Gloves Large [] Gloves XLarge  [] Tape 1\" [x] Tape 4\" [] Tape 3\"  [] Medipore Tape  [x] Saline  [] Skin Prep   [] Adhesive Remover   [] Cotton Tip Applicators   [] Other:    Patient Wound(s) Debrided: [x] Yes if yes please add date 03/29/2022       Debribement Type: Excisional/Sharp    Patient currently being seen by Home Health: [] Yes   [x] No    Duration for needed supplies:  []15  [x]30  []60  []90 Days    Electronically signed by Amanda Angel LPN on 7/77/0285 at 20:43 AM    Provider Information:      PROVIDER'S NAME: Lacie Landry MD

## 2022-03-29 NOTE — WOUND CARE
[address]   Progress Note and Procedure Note     Uriah Spivey  MEDICAL RECORD NUMBER:  [id number]  AGE: 80 y.o. RACE BLACK/  GENDER: female  : 12/10/1917  EPISODE DATE:  3/29/2022    Subjective:   Patient previously seen in this clinic about a year and a half ago for the same problem, and an ulcer on the posterior left calf. Her daughter reports that there was initially a blister subsequently a wound in the area where the previously informed a similar wound, which is completely healed. She does not wear compression stockings. She does not elevate her legs. Chief Complaint   Patient presents with    Wound Check     L leg         HISTORY of PRESENT ILLNESS HPI    Uriah Spivey is a 80 y.o. female who presents today for wound/ulcer evaluation. History of Wound Context: LLE  Wound/Ulcer Pain Timing/Severity: mild  Quality of pain: aching  Severity:  1 / 10   Modifying Factors: None  Associated Signs/Symptoms: edema    Ulcer Identification:  Ulcer Type: venous    Contributing Factors: lymphedema    Wound: LLE         PAST MEDICAL HISTORY    Past Medical History:   Diagnosis Date    Arrhythmia     pacemaker    Arthritis     Chronic kidney disease     [facility] failure ([facility]     Hypertension         PAST SURGICAL HISTORY    Past Surgical History:   Procedure Laterality Date    HX PACEMAKER         FAMILY HISTORY    Family History   Family history unknown: Yes       SOCIAL HISTORY    Social History     Tobacco Use    Smoking status: Never Smoker    Smokeless tobacco: Never Used   Substance Use Topics    Alcohol use: Not Currently     Alcohol/week: 1.0 standard drink     Types: 1 Glasses of wine per week    Drug use: Never       ALLERGIES    No Known Allergies    MEDICATIONS    Current Outpatient Medications on File Prior to Encounter   Medication Sig Dispense Refill    multivitamin (ONE A DAY) tablet Take 1 Tablet by mouth daily.       mupirocin (BACTROBAN) 2 % ointment Apply 1 Tube to affected area daily.  bumetanide (BUMEX) 2 mg tablet Take 2 mg by mouth daily.  isosorbide mononitrate ER (IMDUR) 60 mg CR tablet Take 60 mg by mouth every morning.  metoprolol succinate (TOPROL-XL) 50 mg XL tablet Take 50 mg by mouth daily. No current facility-administered medications on file prior to encounter. REVIEW OF SYSTEMS    Pertinent items are noted in HPI. Objective:     Visit Vitals  BP (!) 173/84 (BP 1 Location: Right upper arm, BP Patient Position: At rest;Sitting)   Pulse 68   Temp 96.9 °F (36.1 °C)   Resp 22   Ht 5' 3\" (1.6 m)   Wt 96.2 kg (212 lb)   BMI 37.55 kg/m²       Wt Readings from Last 3 Encounters:   03/29/22 96.2 kg (212 lb)   12/01/20 96.2 kg (212 lb)   10/02/20 96.2 kg (212 lb)       PHYSICAL EXAM  There is a superficial moderate generalized wound on the left calf, no evidence of infection  Pertinent items are noted in HPI. Assessment:   Current left calf wound, likely venous etiology  Problem List Items Addressed This Visit     None          Wound Leg lower Posterior; Left #1 03/29/22 (Active)   Wound Image   03/29/22 1031   Wound Etiology Venous 03/29/22 1031   Dressing Status Breakthrough drainage noted 03/29/22 1031   Cleansed Cleansed with saline 03/29/22 1031   Wound Length (cm) 6.3 cm 03/29/22 1031   Wound Width (cm) 6 cm 03/29/22 1031   Wound Depth (cm) 0.1 cm 03/29/22 1031   Wound Surface Area (cm^2) 37.8 cm^2 03/29/22 1031   Wound Volume (cm^3) 3.78 cm^3 03/29/22 1031   Post-Procedure Length (cm) 6.3 cm 03/29/22 1050   Post-Procedure Width (cm) 6 cm 03/29/22 1050   Post-Procedure Depth (cm) 0.1 cm 03/29/22 1050   Post-Procedure Surface Area (cm^2) 37.8 cm^2 03/29/22 1050   Post-Procedure Volume (cm^3) 3.78 cm^3 03/29/22 1050   Wound Assessment Slough;Granulation tissue 03/29/22 1031   Drainage Amount Moderate 03/29/22 1031   Drainage Description Serosanguinous 03/29/22 1031   Wound Odor None 03/29/22 1031 Juliet-Wound/Incision Assessment Hyperpigmented;Dry/flaky 03/29/22 1031   Edges Attached edges 03/29/22 1031   Wound Thickness Description Full thickness 03/29/22 1031   Number of days: 0       POP IN PROCEDURE TYPE (DEBRIDEMENT, BIOPSY, I&D, SKIN SUB, CHEMICAL CAUERTY)     Plan:   Silver cell, double Tubigrip compression  Vascular studies  Leg elevation    Treatment Note please see attached Discharge Instructions    Written patient dismissal instructions given to patient or POA. Electronically signed by Maryellen Montana MD on 3/29/2022 at 10:54 AM              Debridement Wound Care        Problem List Items Addressed This Visit     None          Procedure Note  Indications:  Based on my examination of this patient's wound(s)/ulcer(s) today, debridement is required to promote healing and evaluate the wound base. Performed by: Maryellen Montana MD    Consent obtained: Yes    Time out taken: Yes    Debridement: Excisional    Using curette the wound(s)/ulcer(s) was/were sharply debrided down through and including the removal of    subcutaneous tissue    Devitalized Tissue Debrided: slough    Pre Debridement Measurements:  Are located in the Wound/Ulcer Documentation Flow Sheet    Non-Pressure ulcer, fat layer exposed    Wound/Ulcer #: 1    Post Debridement Measurements:  Wound/Ulcer Descriptions are Pre Debridement except measurements:    Wound Leg lower Posterior; Left #1 03/29/22 (Active)   Wound Image   03/29/22 1031   Wound Etiology Venous 03/29/22 1031   Dressing Status Breakthrough drainage noted 03/29/22 1031   Cleansed Cleansed with saline 03/29/22 1031   Wound Length (cm) 6.3 cm 03/29/22 1031   Wound Width (cm) 6 cm 03/29/22 1031   Wound Depth (cm) 0.1 cm 03/29/22 1031   Wound Surface Area (cm^2) 37.8 cm^2 03/29/22 1031   Wound Volume (cm^3) 3.78 cm^3 03/29/22 1031   Post-Procedure Length (cm) 6.3 cm 03/29/22 1050   Post-Procedure Width (cm) 6 cm 03/29/22 1050   Post-Procedure Depth (cm) 0.1 cm 03/29/22 1050   Post-Procedure Surface Area (cm^2) 37.8 cm^2 03/29/22 1050   Post-Procedure Volume (cm^3) 3.78 cm^3 03/29/22 1050   Wound Assessment Slough;Granulation tissue 03/29/22 1031   Drainage Amount Moderate 03/29/22 1031   Drainage Description Serosanguinous 03/29/22 1031   Wound Odor None 03/29/22 1031   Juliet-Wound/Incision Assessment Hyperpigmented;Dry/flaky 03/29/22 1031   Edges Attached edges 03/29/22 1031   Wound Thickness Description Full thickness 03/29/22 1031   Number of days: 0        Total Surface Area Debrided:  21 sq cm     Estimated Blood Loss:  None    Hemostasis Achieved: Pressure    Procedural Pain: 0 / 10     Post Procedural Pain: 0 / 10     Response to treatment: Well tolerated by patient

## 2022-03-29 NOTE — WOUND CARE
Discharge Instructions for  [address] [place] [address]  Telephone: [phone]    NAME:  Dg Given OF BIRTH:  12/10/1917  MEDICAL RECORD NUMBER:  [id number]  DATE:  3/29/2022      Return Appointment:  [x] Dressing supply provider: ARIANA  [] Home Healthcare:  [x] Return Appointment: 2 Week(s)  [] Nurse Visit:   Week(s)    [] Discharge from [facility]  [x] Ordered tests: PVR AND VENOUS REFLUX TO BE DONE AT [place] [address] [phone] TO SCHEDULE  [] Referrals:   [] Rx:   [] Other:      Wound Cleansing:   Do not scrub or use excessive force. Cleanse wound prior to applying a clean dressing with:  [x] Normal Saline   [x] Mild Soap & Water/Baby Shampoo   [] Keep Wound Dry in Shower  [] Other:      Topical Treatments:  Do not apply lotions, creams, or ointments to wound bed unless directed. [] Apply moisturizing lotion to skin surrounding the wound prior to dressing change.  [] Apply antifungal ointment to skin surrounding the wound prior to dressing change.  [] Apply thin film of moisture barrier/zinc ointment to skin immediately around wound. [] Other:       Dressings:           Wound Location LEFT LEG   [x] Apply Primary Dressing:       [] MediHoney Gel    [] MediHoney Alginate               [] Calcium Alginate      [x] Calcium Alginate with Silver   [] Collagen                   [] Collagen with Silver                [] Santyl with Moistened saline gauze              [] Hydrofera Blue (cut to size and moistened)  [] Hydrofera Blue Ready (Cut to size)   [] NS wet to dry    [] Betadine wet to dry              [] Hydrogel                [] Mepitel     [] Bactroban/Mupirocin               [] Other:     [x] Cover and Secure with:     [x] Gauze [x] Thierry [] Kerlix   [] Foam [] Super Absorbant [] ABD     [] ACE Wrap            [] Other:    Limit contact of tape with skin.     [x] Change dressing: [] Daily    [x] Every Other Day [] Once per week   [] Twice per week   [] Three times per week [] Do Not Change Dressing   [] Other:     Negative Pressure:           Wound Location:   [] Pressure @  mm/Hg  []Continuous []Intermittent   [] Black Foam [] White Foam              [] Bridge:               [] Change vac dressing three times per week    Pressure Relief:  [] When sitting, shift position or do seat lifts every 15 minutes.  [] Wheelchair cushion [] Specialty Bed/Mattress  [] Turn every 2 hours when in bed. Avoid direct pressure on wound site. Limit side lying to 30 degree tilt. Limit HOB elevation to 30 degrees. Edema Control:  Apply: [] Compression Stocking:    mmHg  []Right Leg []Left Leg   [x] Tubigrip [x]Right Leg Double Layer [x]Left Leg Double Layer      []Right Leg Single Layer []Left Leg Single Layer     [] Elevate leg(s) above the level of the heart when sitting. [] Avoid prolonged standing in one place. Compression:  Apply: [] Multilayer Compression Wrap: []RightLeg []Left Leg                                 []Coflex   []Coflex Lite             []Unna     [] Do not get leg(s) with wrap wet. If wraps become too tight call the center or completely remove the wrap. [] Elevate leg(s) above the level of the heart when sitting. [] Avoid prolonged standing in one place. Off-Loading:   [] Off-loading when [] walking  [] in bed [] sitting  [] Total non-weight bearing  [] Right Leg  [] Left Leg   [] Assistive Device [] Walker [] Cane      [] Wheelchair  [] Crutches   [] Surgical shoe    [] Podus Boot(s)   [] Foam Boot(s)  [] Roll About    [] Cast Boot [] CROW Boot  [] Wedge Shoe  [] Other:    Contact Cast:  Apply: [] Total Contact Cast Applied in Clinic []RightLeg []Left Leg   [] Do not get cast wet. Contact center or go to emergency room if there is a foul odor or becomes uncomfortable due to feeling tight or swelling. Do not use objects inside of cast to scratch.       Dietary:  [] Diet as tolerated: [x] Calorie Diabetic Diet: [] No Added Salt:  [] Increase Protein: [] Other:     Activity:  [x] Activity as tolerated:    [] Patient has no activity restrictions       [] Strict Bedrest:   [] Remain off Work:       [] May return to full duty work:                                     [] Return to work with restrictions:               [address] [facility] Information: Should you experience any significant changes in your wound(s) or have questions about your wound care, please contact Kelly Pearson at [place] [address] 8:00 am - 4:30. If you need help with your wound outside of these hours and cannot wait until we are again available, contact your PCP or go to the hospital emergency room. PLEASE NOTE: IF YOU ARE UNABLE TO OBTAIN WOUND SUPPLIES, CONTINUE TO USE THE SUPPLIES YOU HAVE AVAILABLE UNTIL YOU ARE ABLE TO REACH US. IT IS MOST IMPORTANT TO KEEP THE WOUND COVERED AT ALL TIMES.     [x] Dr. Mirza Braden   [] Dr. Kd Christy  [] Dr. Shantelle Bates

## 2022-04-12 ENCOUNTER — HOSPITAL ENCOUNTER (OUTPATIENT)
Dept: WOUND CARE | Age: 87
Discharge: HOME | End: 2022-04-12
Payer: MEDICARE

## 2022-04-12 VITALS
HEART RATE: 61 BPM | SYSTOLIC BLOOD PRESSURE: 141 MMHG | RESPIRATION RATE: 22 BRPM | DIASTOLIC BLOOD PRESSURE: 75 MMHG | TEMPERATURE: 97.3 F

## 2022-04-12 PROCEDURE — 99212 OFFICE O/P EST SF 10 MIN: CPT

## 2022-04-12 NOTE — DISCHARGE INSTRUCTIONS
Discharge Instructions for  [address] [place] [address], [address] [place] [address]  Telephone: [phone]    NAME:  Kaiser Jobs OF BIRTH:  12/10/1917  MEDICAL RECORD NUMBER:  [id number]  DATE:  4/12/2022      Return Appointment:  [x] Dressing supply provider: ARIANA  [] Home Healthcare:  [] Return Appointment:  [facility])  [] Nurse Visit:   Week(s)    [] Discharge from [facility]  [] Ordered tests:  [] Referrals:   [] Rx:   [x] Other:  Vitamin E cream- over the counter    Wound Cleansing:   Do not scrub or use excessive force. Cleanse wound prior to applying a clean dressing with:  [x] Normal Saline   [x] Mild Soap & Water/Baby Shampoo   [] Keep Wound Dry in Shower  [] Other:      Topical Treatments:  Do not apply lotions, creams, or ointments to wound bed unless directed. [] Apply moisturizing lotion to skin surrounding the wound prior to dressing change.  [] Apply antifungal ointment to skin surrounding the wound prior to dressing change.  [] Apply thin film of moisture barrier/zinc ointment to skin immediately around wound. [] Other:       Dressings:           Wound Location     [] Apply Primary Dressing:       [] MediHoney Gel    [] MediHoney Alginate               [] Calcium Alginate      [] Calcium Alginate with Silver   [] Collagen                   [] Collagen with Silver                [] Santyl with Moistened saline gauze              [] Hydrofera Blue (cut to size and moistened)  [] Hydrofera Blue Ready (Cut to size)   [] NS wet to dry    [] Betadine wet to dry              [] Hydrogel                [] Mepitel     [] Bactroban/Mupirocin               [] Other:     [] Cover and Secure with:     [] Gauze [] Thierry [] Kerlix   [] Foam [] Super Absorbant [] ABD     [] ACE Wrap            [] Other:    Limit contact of tape with skin.     [] Change dressing: [] Daily    [] Every Other Day [] Once per week   [] Twice per week   [] Three times per week [] Do Not Change Dressing   [] Other:     Negative Pressure:           Wound Location:   [] Pressure @  mm/Hg  []Continuous []Intermittent   [] Black Foam [] White Foam              [] Bridge:               [] Change vac dressing three times per week    Pressure Relief:  [] When sitting, shift position or do seat lifts every 15 minutes.  [] Wheelchair cushion [] Specialty Bed/Mattress  [] Turn every 2 hours when in bed. Avoid direct pressure on wound site. Limit side lying to 30 degree tilt. Limit HOB elevation to 30 degrees. Edema Control:  Apply: [] Compression Stocking:    mmHg  []Right Leg []Left Leg   [] Tubigrip []Right Leg Double Layer []Left Leg Double Layer      []Right Leg Single Layer []Left Leg Single Layer     [] Elevate leg(s) above the level of the heart when sitting. [] Avoid prolonged standing in one place. Compression:  Apply: [] Multilayer Compression Wrap: []RightLeg []Left Leg                                 []Coflex   []Coflex Lite             []Unna     [] Do not get leg(s) with wrap wet. If wraps become too tight call the center or completely remove the wrap. [] Elevate leg(s) above the level of the heart when sitting. [] Avoid prolonged standing in one place. Off-Loading:   [] Off-loading when [] walking  [] in bed [] sitting  [] Total non-weight bearing  [] Right Leg  [] Left Leg   [] Assistive Device [] Walker [] Cane      [] Wheelchair  [] Crutches   [] Surgical shoe    [] Podus Boot(s)   [] Foam Boot(s)  [] Roll About    [] Cast Boot [] CROW Boot  [] Wedge Shoe  [] Other:    Contact Cast:  Apply: [] Total Contact Cast Applied in Clinic []RightLeg []Left Leg   [] Do not get cast wet. Contact center or go to emergency room if there is a foul odor or becomes uncomfortable due to feeling tight or swelling. Do not use objects inside of cast to scratch.       Dietary:  [] Diet as tolerated: [x] Calorie Diabetic Diet: [] No Added Salt:  [] Increase Protein: [] Other:     Activity:  [x] Activity as tolerated:    [] Patient has no activity restrictions       [] Strict Bedrest:   [] Remain off Work:       [] May return to full duty work:                                     [] Return to work with restrictions:               [address] [facility] Information: Should you experience any significant changes in your wound(s) or have questions about your wound care, please contact Kelly Pearson at [place] [address] 8:00 am - 4:30. If you need help with your wound outside of these hours and cannot wait until we are again available, contact your PCP or go to the hospital emergency room. PLEASE NOTE: IF YOU ARE UNABLE TO OBTAIN WOUND SUPPLIES, CONTINUE TO USE THE SUPPLIES YOU HAVE AVAILABLE UNTIL YOU ARE ABLE TO REACH US. IT IS MOST IMPORTANT TO KEEP THE WOUND COVERED AT ALL TIMES.     [x] Dr. Ayaka Grande   [] Dr. Rachel Sanz  [] Dr. Rao Power

## 2022-04-12 NOTE — WOUND CARE
Discharge Instructions for  [address] [place] [address], [address] [place] [address]  Telephone: [phone]    NAME:  Cachorro Hansen OF BIRTH:  12/10/1917  MEDICAL RECORD NUMBER:  [id number]  DATE:  4/12/2022      Return Appointment:  [] Dressing supply provider: ARIANA  [] Home Healthcare:  [] Return Appointment:  [facility])  [] Nurse Visit:   Week(s)    [x] Discharge from [facility]  [] Ordered tests:  [] Referrals:   [] Rx:   [x] Other:  Vitamin E cream- over the counter    Wound Cleansing:   Do not scrub or use excessive force. Cleanse wound prior to applying a clean dressing with:  [x] Normal Saline   [x] Mild Soap & Water/Baby Shampoo   [] Keep Wound Dry in Shower  [] Other:      Topical Treatments:  Do not apply lotions, creams, or ointments to wound bed unless directed. [] Apply moisturizing lotion to skin surrounding the wound prior to dressing change.  [] Apply antifungal ointment to skin surrounding the wound prior to dressing change.  [] Apply thin film of moisture barrier/zinc ointment to skin immediately around wound. [] Other:       Dressings:           Wound Location     [] Apply Primary Dressing:       [] MediHoney Gel    [] MediHoney Alginate               [] Calcium Alginate      [] Calcium Alginate with Silver   [] Collagen                   [] Collagen with Silver                [] Santyl with Moistened saline gauze              [] Hydrofera Blue (cut to size and moistened)  [] Hydrofera Blue Ready (Cut to size)   [] NS wet to dry    [] Betadine wet to dry              [] Hydrogel                [] Mepitel     [] Bactroban/Mupirocin               [] Other:     [] Cover and Secure with:     [] Gauze [] Thierry [] Kerlix   [] Foam [] Super Absorbant [] ABD     [] ACE Wrap            [] Other:    Limit contact of tape with skin.     [] Change dressing: [] Daily    [] Every Other Day [] Once per week   [] Twice per week   [] Three times per week [] Do Not Change Dressing   [] Other:     Negative Pressure:           Wound Location:   [] Pressure @  mm/Hg  []Continuous []Intermittent   [] Black Foam [] White Foam              [] Bridge:               [] Change vac dressing three times per week    Pressure Relief:  [] When sitting, shift position or do seat lifts every 15 minutes.  [] Wheelchair cushion [] Specialty Bed/Mattress  [] Turn every 2 hours when in bed. Avoid direct pressure on wound site. Limit side lying to 30 degree tilt. Limit HOB elevation to 30 degrees. Edema Control:  Apply: [] Compression Stocking:    mmHg  []Right Leg []Left Leg   [] Tubigrip []Right Leg Double Layer []Left Leg Double Layer      []Right Leg Single Layer []Left Leg Single Layer     [] Elevate leg(s) above the level of the heart when sitting. [] Avoid prolonged standing in one place. Compression:  Apply: [] Multilayer Compression Wrap: []RightLeg []Left Leg                                 []Coflex   []Coflex Lite             []Unna     [] Do not get leg(s) with wrap wet. If wraps become too tight call the center or completely remove the wrap. [] Elevate leg(s) above the level of the heart when sitting. [] Avoid prolonged standing in one place. Off-Loading:   [] Off-loading when [] walking  [] in bed [] sitting  [] Total non-weight bearing  [] Right Leg  [] Left Leg   [] Assistive Device [] Walker [] Cane      [] Wheelchair  [] Crutches   [] Surgical shoe    [] Podus Boot(s)   [] Foam Boot(s)  [] Roll About    [] Cast Boot [] CROW Boot  [] Wedge Shoe  [] Other:    Contact Cast:  Apply: [] Total Contact Cast Applied in Clinic []RightLeg []Left Leg   [] Do not get cast wet. Contact center or go to emergency room if there is a foul odor or becomes uncomfortable due to feeling tight or swelling. Do not use objects inside of cast to scratch.       Dietary:  [] Diet as tolerated: [x] Calorie Diabetic Diet: [] No Added Salt:  [] Increase Protein: [] Other:     Activity:  [x] Activity as tolerated:    [] Patient has no activity restrictions       [] Strict Bedrest:   [] Remain off Work:       [] May return to full duty work:                                     [] Return to work with restrictions:               [address] Information: Should you experience any significant changes in your wound(s) or have questions about your wound care, please contact Kelly Echeverria [address] at [place] [address] 8:00 am - 4:30. If you need help with your wound outside of these hours and cannot wait until we are again available, contact your PCP or go to the hospital emergency room. PLEASE NOTE: IF YOU ARE UNABLE TO OBTAIN WOUND SUPPLIES, CONTINUE TO USE THE SUPPLIES YOU HAVE AVAILABLE UNTIL YOU ARE ABLE TO REACH US. IT IS MOST IMPORTANT TO KEEP THE WOUND COVERED AT ALL TIMES.     [x] Dr. Cynthia Gómez   [] Dr. Daryl Coffman  [] Dr. Mukesh Mejia

## 2022-04-12 NOTE — WOUND CARE
[address]   Progress Note and Procedure Note   NO Procedure      Walker Antony  MEDICAL RECORD NUMBER:  [id number]  AGE: 80 y.o. RACE BLACK/  GENDER: female  : 12/10/1917  EPISODE DATE:  2022    Subjective:   Patient continues to complain of pain in the left calf wound, despite. Today appearing to be healed; there is no report of drainage, no fever noted  Chief Complaint   Patient presents with    Wound Check     left leg         HISTORY of PRESENT ILLNESS HPI    Walker Antony is a 80 y.o. female who presents today for wound/ulcer evaluation. History of Wound Context: LLE  Wound/Ulcer Pain Timing/Severity: mild  Quality of pain: sharp  Severity:  1 / 10   Modifying Factors: None  Associated Signs/Symptoms: edema    Ulcer Identification:  Ulcer Type: venous    Contributing Factors: edema    Wound: LLE         PAST MEDICAL HISTORY    Past Medical History:   Diagnosis Date    Arrhythmia     pacemaker    Arthritis     Chronic kidney disease     Heart failure ([facility] [address]     Hypertension         PAST SURGICAL HISTORY    Past Surgical History:   Procedure Laterality Date    HX PACEMAKER         FAMILY HISTORY    Family History   Family history unknown: Yes       SOCIAL HISTORY    Social History     Tobacco Use    Smoking status: Never Smoker    Smokeless tobacco: Never Used   Substance Use Topics    Alcohol use: Not Currently     Alcohol/week: 1.0 standard drink     Types: 1 Glasses of wine per week    Drug use: Never       ALLERGIES    No Known Allergies    MEDICATIONS    Current Outpatient Medications on File Prior to Encounter   Medication Sig Dispense Refill    multivitamin (ONE A DAY) tablet Take 1 Tablet by mouth daily.  mupirocin (BACTROBAN) 2 % ointment Apply 1 Tube to affected area daily.  bumetanide (BUMEX) 2 mg tablet Take 2 mg by mouth daily.  isosorbide mononitrate ER (IMDUR) 60 mg CR tablet Take 60 mg by mouth every morning.       metoprolol succinate (TOPROL-XL) 50 mg XL tablet Take 50 mg by mouth daily. No current facility-administered medications on file prior to encounter. REVIEW OF SYSTEMS    Pertinent items are noted in HPI. Objective:     Visit Vitals  BP (!) 141/75 (BP 1 Location: Right arm, BP Patient Position: At rest;Sitting)   Pulse 61   Temp 97.3 °F (36.3 °C)   Resp 22       Wt Readings from Last 3 Encounters:   03/29/22 96.2 kg (212 lb)   12/01/20 96.2 kg (212 lb)   10/02/20 96.2 kg (212 lb)       PHYSICAL EXAM  The left calf wound is completely reepithelialized  Pertinent items are noted in HPI. Assessment:   Healed wound  Problem List Items Addressed This Visit     None          Procedure Note  Indications:  Based on my examination of this patient's wound(s)/ulcer(s) today, debridement is not required to promote healing and evaluate the wound base. Plan:   Return to the wound center in the future as needed  Follow-up with medical doctor regarding possible neuropathy  Wear compression stockings    Treatment Note please see attached Discharge Instructions    Written patient dismissal instructions given to patient or POA.          Electronically signed by Kyung Robison MD on 4/12/2022 at 1:08 PM

## 2023-05-21 RX ORDER — BUMETANIDE 2 MG/1
2 TABLET ORAL DAILY
COMMUNITY

## 2023-05-21 RX ORDER — METOPROLOL SUCCINATE 50 MG/1
50 TABLET, EXTENDED RELEASE ORAL DAILY
COMMUNITY

## 2023-05-21 RX ORDER — ISOSORBIDE MONONITRATE 60 MG/1
60 TABLET, EXTENDED RELEASE ORAL
COMMUNITY